# Patient Record
Sex: FEMALE | Race: WHITE | Employment: UNEMPLOYED | URBAN - METROPOLITAN AREA
[De-identification: names, ages, dates, MRNs, and addresses within clinical notes are randomized per-mention and may not be internally consistent; named-entity substitution may affect disease eponyms.]

---

## 2015-12-02 LAB — PAP-ABSTRACT: NORMAL

## 2017-01-02 ENCOUNTER — DOCUMENTATION ONLY (OUTPATIENT)
Dept: SLEEP MEDICINE | Facility: CLINIC | Age: 62
End: 2017-01-02

## 2017-01-02 NOTE — PROGRESS NOTES
SUBJECTIVE:  Chief Complaint   Patient presents with     Sleep Problem     drop off actigraphy       OBJECTIVE:  actigraphy  returned to clinic today January 2, 2017.    ASSESSMENT/PLAN:  Danii has an appointment with provider to review results

## 2017-01-03 DIAGNOSIS — N32.81 OAB (OVERACTIVE BLADDER): Primary | ICD-10-CM

## 2017-01-03 RX ORDER — MIRABEGRON 50 MG/1
50 TABLET, EXTENDED RELEASE ORAL DAILY
Qty: 30 TABLET | Refills: 0 | Status: SHIPPED | OUTPATIENT
Start: 2017-01-03 | End: 2018-04-24

## 2017-01-03 NOTE — TELEPHONE ENCOUNTER
Has upcoming appt.  1 more refill provided with Msg to keep that appt.  Lilliam Ashraf RN  [facility]

## 2017-08-21 ENCOUNTER — TRANSFERRED RECORDS (OUTPATIENT)
Dept: HEALTH INFORMATION MANAGEMENT | Facility: CLINIC | Age: 62
End: 2017-08-21

## 2017-08-29 ENCOUNTER — HOSPITAL ENCOUNTER (OUTPATIENT)
Dept: MRI IMAGING | Facility: CLINIC | Age: 62
Discharge: HOME | End: 2017-08-29
Attending: ORTHOPAEDIC SURGERY | Admitting: ORTHOPAEDIC SURGERY
Payer: MEDICARE

## 2017-08-29 DIAGNOSIS — M25.551 RIGHT HIP PAIN: ICD-10-CM

## 2017-08-29 DIAGNOSIS — M25.552 LEFT HIP PAIN: ICD-10-CM

## 2017-08-29 PROCEDURE — 72148 MRI LUMBAR SPINE W/O DYE: CPT

## 2017-09-27 ENCOUNTER — HOSPITAL ENCOUNTER (OUTPATIENT)
Dept: PHYSICAL THERAPY | Facility: CLINIC | Age: 62
Setting detail: THERAPIES SERIES
End: 2017-09-27
Attending: ORTHOPAEDIC SURGERY
Payer: MEDICARE

## 2017-09-27 PROCEDURE — G8978 MOBILITY CURRENT STATUS: HCPCS | Mod: GP,CK | Performed by: PHYSICAL THERAPIST

## 2017-09-27 PROCEDURE — G8979 MOBILITY GOAL STATUS: HCPCS | Mod: GP,CI | Performed by: PHYSICAL THERAPIST

## 2017-09-27 PROCEDURE — 97161 PT EVAL LOW COMPLEX 20 MIN: CPT | Mod: GP | Performed by: PHYSICAL THERAPIST

## 2017-09-27 PROCEDURE — 97110 THERAPEUTIC EXERCISES: CPT | Mod: GP | Performed by: PHYSICAL THERAPIST

## 2017-09-27 PROCEDURE — 40000718 ZZHC STATISTIC PT DEPARTMENT ORTHO VISIT: Performed by: PHYSICAL THERAPIST

## 2017-09-27 PROCEDURE — 97140 MANUAL THERAPY 1/> REGIONS: CPT | Mod: GP | Performed by: PHYSICAL THERAPIST

## 2017-09-27 NOTE — PROGRESS NOTES
[facility]          OUTPATIENT PHYSICAL THERAPY ORTHOPEDIC EVALUATION  PLAN OF TREATMENT FOR OUTPATIENT REHABILITATION  (COMPLETE FOR INITIAL CLAIMS ONLY)  Patient's Last Name, First Name, M.I.  YOB: 1955  Danii Levy    Provider s Name:  [facility]   Medical Record No.  [id number]   Start of Care Date:  09/27/17   Onset Date:  09/10/16   Type:     _X__PT   ___OT   ___SLP Medical Diagnosis:  Lumbar spondylitis     PT Diagnosis:  Lumbar spine pain with core musculature flexibilty and strength deficits with possible radicular involvement   Visits from SOC:  1      _________________________________________________________________________________  Plan of Treatment/Functional Goals:  joint mobilization, manual therapy, neuromuscular re-education, ROM, strengthening, stretching     Cryotherapy, Hot packs, TENS     Goals  Goal Identifier: HEP  Goal Description: Pt will be independent in HEP in order to achieve and maintain long term treatment goals.  Target Date: 10/11/17    Goal Identifier: Sitting  Goal Description: Pt will be able to sit for 20 miniutes with a minimal increase in low back pain 1-2/10.  Target Date: 11/22/17    Goal Identifier: Walking  Goal Description: Pt will be able to stand up straight first thing in the morning and walk with a minimal increase in pain 1-2/10.  Target Date: 11/22/17                                                           Therapy Frequency:  1 time/week  Predicted Duration of Therapy Intervention:  8 weeks    Jose Elias Melendez, PT                 I CERTIFY THE NEED FOR THESE SERVICES FURNISHED UNDER        THIS PLAN OF TREATMENT AND WHILE UNDER MY CARE .             Physician Signature               Date    X_____________________________________________________                               Certification Date From:  09/27/17   Certification Date To:  11/22/17    Referring Provider:  Maurizio Angeles  Assessment        See Epic Evaluation Start of Care Date: 09/27/17

## 2017-09-27 NOTE — PROGRESS NOTES
Danii Levy  1955 Physical Therapy Initial Evaluation  09/27/17 1500   General Information   Type of Visit Initial OP Ortho PT Evaluation   Start of Care Date 09/27/17   Referring Physician Maurizio Denton    Patient/Family Goals Statement Walk without pain   Orders Evaluate and Treat   Orders Comment Stretching / PROM / AAROM / Strengthening stabilization / Modities as indicated   Date of Order 08/31/17   Insurance Type Medicare;MA   Medical Diagnosis Lumbar spondylitis   Surgical/Medical history reviewed Yes   Precautions/Limitations no known precautions/limitations   Body Part(s)   Body Part(s) Lumbar Spine/SI   Presentation and Etiology   Pertinent history of current problem (include personal factors and/or comorbidities that impact the POC) Was doing squats with weights and started having pain in low back about 1 year ago. When sits for a long time will have pain. Also in the morning pain is worse. 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 Moved about 6 weks ago and pain has been worse in the morning. Gets pain down both legs to above knee. Nothing will make pain less. Starting to have some muscle spasms higher in back right now. / Comorbidities - Bipolar affective disorder      Impairments A. Pain;D. Decreased ROM;E. Decreased flexibility;H. Impaired gait   Functional Limitations perform activities of daily living;perform desired leisure / sports activities   Symptom Location Lumbar and thoracic spine, LE's B   How/Where did it occur At home;During recreation/sports   Onset date of current episode/exacerbation 09/10/16   Chronicity Recurrent   Pain quality A. Sharp;E. Shooting;F. Stabbing   Frequency of pain/symptoms A. Constant   Pain/symptoms are: Worse in the morning   Pain/symptoms exacerbated by A. Sitting;B. Walking;C. Lifting;D. Carrying;G. Certain positions;I. Bending;K. Home tasks   Pain/symptoms eased by K. Other   Pain eased by comment The pain is never better! Just the intensity    Prior Level of Function   Functional Level Prior Comment Ind   Current Level of Function   Patient role/employment history B. Student;E. Unemployed   Living environment House/townhome   Home/community accessibility 1 flight with rail   Fall Risk Screen   Fall screen completed by PT   Per patient - Fall 2 or more times in past year? No   Per patient - Fall with injury in past year? No   Is patient a fall risk? No   Lumbar Spine/SI Objective Findings   Gait/Locomotion Reduced lumbar rotation with small steps and reduced heel strike   Flexion ROM 18 inches from floor   Extension ROM 5 degrees   Right Side Bending ROM Pain in lumbar spine   Left Side Bending ROM Pain in lumbar spine   Repeated Extension-Standing ROM Pain in lumbar spine   Repeated Flexion-Standing ROM Pain into LE's B   Pelvic Screen Negative for SI provocation tests   Hip Screen Negative hip scour   Hip Flexion (L2) Strength 4-/5 on right and 4/5 on left with pain B   Hip Abduction Strength 4-/5 on right and 4/5 on left   Hip Extension Strength Unable to perform due to pain in lumbar spine   Knee Flexion Strength 5/5 B   Knee Extension (L3) Strength 4/5 B   Ankle Dorsiflexion (L4) Strength 5/5 B   Great Toe Extension (L5) Strength 5/5 B   Ankle Plantar Flexion (S1) Strength 5/5 B   Hamstring Flexibility Moderately restricted B   Piriformis Flexibility Moderately restricted B   SLR Negative B   Repeated Extension Prone Pain with prone position   Segmental Mobility Restricted T10-L5   Sensation Testing No deficits noted   Patellar Tendon Reflexes  2+ B   Palpation Right lumbar and thoracic paraspinals T10-L5   Planned Therapy Interventions   Planned Therapy Interventions joint mobilization;manual therapy;neuromuscular re-education;ROM;strengthening;stretching   Planned Modality Interventions   Planned Modality Interventions Cryotherapy;Hot packs;TENS   Clinical Impression   Criteria for Skilled Therapeutic Interventions Met yes, treatment indicated    PT Diagnosis Lumbar spine pain with core musculature flexibilty and strength deficits with possible radicular involvement   Influenced by the following impairments Pain, Strength deficits, Flexibility deficits   Functional limitations due to impairments Difficulty sitting, walking   Clinical Presentation Stable/Uncomplicated   Clinical Presentation Rationale 1 comorbidity impacting PT / 1 body system / Stable   Clinical Decision Making (Complexity) Low complexity   Therapy Frequency 1 time/week   Predicted Duration of Therapy Intervention (days/wks) 8 weeks   Risk & Benefits of therapy have been explained Yes   Patient, Family & other staff in agreement with plan of care Yes   Education Assessment   Preferred Learning Style Listening;Reading;Demonstration;Pictures/video   Barriers to Learning No barriers   ORTHO GOALS   PT Ortho Eval Goals 1;2;3;4   Ortho Goal 1   Goal Identifier HEP   Goal Description Pt will be independent in HEP in order to achieve and maintain long term treatment goals.   Target Date 10/11/17   Ortho Goal 2   Goal Identifier Sitting   Goal Description Pt will be able to sit for 20 miniutes with a minimal increase in low back pain 1-2/10.   Target Date 11/22/17   Ortho Goal 3   Goal Identifier Walking   Goal Description Pt will be able to stand up straight first thing in the morning and walk with a minimal increase in pain 1-2/10.   Target Date 11/22/17   Total Evaluation Time   Total Evaluation Time 19   Therapy Certification   Certification date from 09/27/17   Certification date to 11/22/17   Medical Diagnosis Lumbar spondylitis     Florentin Melendez PT, DPT

## 2017-10-02 ENCOUNTER — HOSPITAL ENCOUNTER (OUTPATIENT)
Dept: PHYSICAL THERAPY | Facility: CLINIC | Age: 62
Setting detail: THERAPIES SERIES
End: 2017-10-02
Attending: ORTHOPAEDIC SURGERY
Payer: MEDICARE

## 2017-10-02 PROCEDURE — 97140 MANUAL THERAPY 1/> REGIONS: CPT | Mod: GP | Performed by: PHYSICAL THERAPIST

## 2017-10-02 PROCEDURE — 97110 THERAPEUTIC EXERCISES: CPT | Mod: GP | Performed by: PHYSICAL THERAPIST

## 2017-10-02 PROCEDURE — 40000718 ZZHC STATISTIC PT DEPARTMENT ORTHO VISIT: Performed by: PHYSICAL THERAPIST

## 2017-10-20 ENCOUNTER — HOSPITAL ENCOUNTER (OUTPATIENT)
Dept: PHYSICAL THERAPY | Facility: CLINIC | Age: 62
Setting detail: THERAPIES SERIES
End: 2017-10-20
Attending: ORTHOPAEDIC SURGERY
Payer: MEDICARE

## 2017-10-20 PROCEDURE — 97110 THERAPEUTIC EXERCISES: CPT | Mod: GP | Performed by: PHYSICAL THERAPIST

## 2017-10-20 PROCEDURE — 97140 MANUAL THERAPY 1/> REGIONS: CPT | Mod: GP | Performed by: PHYSICAL THERAPIST

## 2017-10-20 PROCEDURE — 40000718 ZZHC STATISTIC PT DEPARTMENT ORTHO VISIT: Performed by: PHYSICAL THERAPIST

## 2018-03-05 ENCOUNTER — DOCUMENTATION ONLY (OUTPATIENT)
Dept: PHYSICAL THERAPY | Facility: CLINIC | Age: 63
End: 2018-03-05

## 2018-03-05 NOTE — PROGRESS NOTES
Outpatient Physical Therapy Discharge Note     Patient: Danii Levy  : 1955    Beginning/End Dates of Reporting Period:  2017 to 10/20/2017 (Total of 3 visits)    Referring Provider: Maurizio Denton    Diagnosis: Lumbar spondylitis     Client Self Report:  (From date of last visit)  Pt states her back is  hurting terribly thelast  three days, can't get  up in the morning out of  bed due to the pain.  pt states she is frustr-  ated, has incorporated  more yoga poses like  down dog into her routi-  ne with the PT stretches  because she feels that  she is not doing enough  physical activity alin-  red to a year ago. 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 pt  states she won't accept  the fact that her back  is as a result of aging,  does not want to hear  anymore about degenerat-  pilo changes. was very  upset when she heard  something was wrong on  her back from her MRI.      Treatment Has Consisted Of:  Stretching and strengthening exercise instruction / Soft tissue mobilization / Joint mobilizations / Pt education regarding diagnosis    Goals:  Goals had not been met at time of last visit.    Plan:  Discharge from therapy.    Discharge:    Reason for Discharge: Patient has failed to schedule further appointments.  Medicare G-code: Patient did not attend a final scheduled session prior to discharge. Unable to determine discharge functional status.    Equipment Issued: None    Discharge Plan: Patient to continue home program.    Florentin Melendez, PT, DPT

## 2018-04-11 ENCOUNTER — TELEPHONE (OUTPATIENT)
Dept: OBGYN | Facility: CLINIC | Age: 63
End: 2018-04-11

## 2018-04-24 ENCOUNTER — OFFICE VISIT (OUTPATIENT)
Dept: OBGYN | Facility: CLINIC | Age: 63
End: 2018-04-24
Payer: MEDICARE

## 2018-04-24 VITALS
BODY MASS INDEX: 19.88 KG/M2 | TEMPERATURE: 96.6 F | HEIGHT: 62 IN | SYSTOLIC BLOOD PRESSURE: 96 MMHG | WEIGHT: 108 LBS | HEART RATE: 70 BPM | RESPIRATION RATE: 14 BRPM | DIASTOLIC BLOOD PRESSURE: 59 MMHG

## 2018-04-24 DIAGNOSIS — N95.2 VAGINAL ATROPHY: Primary | ICD-10-CM

## 2018-04-24 DIAGNOSIS — Z01.419 ENCOUNTER FOR GYNECOLOGICAL EXAMINATION WITHOUT ABNORMAL FINDING: ICD-10-CM

## 2018-04-24 PROBLEM — Z90.710 S/P HYSTERECTOMY WITH OOPHORECTOMY: Status: ACTIVE | Noted: 2018-04-24

## 2018-04-24 PROBLEM — F60.3 BORDERLINE PERSONALITY DISORDER (H): Status: ACTIVE | Noted: 2018-04-24

## 2018-04-24 PROBLEM — Z90.721 S/P HYSTERECTOMY WITH OOPHORECTOMY: Status: ACTIVE | Noted: 2018-04-24

## 2018-04-24 PROCEDURE — 87624 HPV HI-RISK TYP POOLED RSLT: CPT | Performed by: OBSTETRICS & GYNECOLOGY

## 2018-04-24 PROCEDURE — G0145 SCR C/V CYTO,THINLAYER,RESCR: HCPCS | Performed by: OBSTETRICS & GYNECOLOGY

## 2018-04-24 PROCEDURE — G0476 HPV COMBO ASSAY CA SCREEN: HCPCS | Performed by: OBSTETRICS & GYNECOLOGY

## 2018-04-24 PROCEDURE — 99203 OFFICE O/P NEW LOW 30 MIN: CPT | Mod: 25 | Performed by: OBSTETRICS & GYNECOLOGY

## 2018-04-24 RX ORDER — NAPROXEN 500 MG/1
TABLET ORAL
Refills: 2 | COMMUNITY
Start: 2018-01-27

## 2018-04-24 RX ORDER — METHYLPHENIDATE HYDROCHLORIDE 20 MG/1
TABLET ORAL
COMMUNITY
Start: 2018-02-24

## 2018-04-24 RX ORDER — LAMOTRIGINE 100 MG/1
100 TABLET ORAL DAILY
COMMUNITY
Start: 2018-03-27

## 2018-04-24 RX ORDER — ESTRADIOL 0.1 MG/G
2 CREAM VAGINAL
Qty: 42.5 G | Refills: 3 | Status: SHIPPED | OUTPATIENT
Start: 2018-04-26

## 2018-04-24 RX ORDER — CYCLOBENZAPRINE HCL 10 MG
10 TABLET ORAL
COMMUNITY
Start: 2018-03-30

## 2018-04-24 RX ORDER — TRETINOIN 1 MG/G
CREAM TOPICAL
COMMUNITY
Start: 2018-04-16

## 2018-04-24 RX ORDER — DIAZEPAM 10 MG
10 TABLET ORAL
COMMUNITY

## 2018-04-24 RX ORDER — CYCLOSPORINE 0.5 MG/ML
EMULSION OPHTHALMIC
Refills: 11 | COMMUNITY
Start: 2018-03-27

## 2018-04-24 RX ORDER — TRAMADOL HYDROCHLORIDE 50 MG/1
TABLET ORAL
Refills: 2 | COMMUNITY
Start: 2017-08-24

## 2018-04-24 NOTE — MR AVS SNAPSHOT
"              After Visit Summary   2018    Danii Levy    MRN: [id number]           Patient Information     Date Of Birth          1955        Visit Information        Provider Department      2018 1:00 PM Ramona Calvert MD [facility]        Today's Diagnoses     Vaginal atrophy    -  1    Encounter for gynecological examination without abnormal finding           Follow-ups after your visit        Who to contact     If you have questions or need follow up information about today's clinic visit or your schedule please contact [facility] directly at [phone].  Normal or non-critical lab and imaging results will be communicated to you by Appoxeehart, letter or phone within 4 business days after the clinic has received the results. If you do not hear from us within 7 days, please contact the clinic through NoteWagont or phone. If you have a critical or abnormal lab result, we will notify you by phone as soon as possible.  Submit refill requests through [email or web address] or call your pharmacy and they will forward the refill request to us. Please allow 3 business days for your refill to be completed.          Additional Information About Your Visit        MyChart Information     [email or web address] lets you send messages to your doctor, view your test results, renew your prescriptions, schedule appointments and more. To sign up, go to [email or web address] . Click on \"Log in\" on the left side of the screen, which will take you to the Welcome page. Then click on \"Sign up Now\" on the right side of the page.     You will be asked to enter the access code listed below, as well as some personal information. Please follow the directions to create your username and password.     Your access code is: [email or web address]  Expires: 2018  2:39 PM     Your access code will  in 90 days. If you need help or a new code, please call your [facility] or [phone].        Care EveryWhere ID     This " "is your Care EveryWhere ID. This could be used by other organizations to access your [place] medical records  [id number]        Your Vitals Were     Pulse Temperature Respirations Height Breastfeeding? BMI (Body Mass Index)    70 96.6  F (35.9  C) (Tympanic) 14 5' 1.75\" (1.568 m) No 19.91 kg/m2       Blood Pressure from Last 3 Encounters:   04/24/18 96/59   12/09/16 112/72   11/08/16 116/71    Weight from Last 3 Encounters:   04/24/18 108 lb (49 kg)   12/09/16 103 lb (46.7 kg)   05/15/13 95 lb (43.1 kg)              We Performed the Following     HC VAGINAL DILATOR     HPV High Risk Types DNA Cervical     Pap imaged thin layer screen with HPV - recommended age 30 - 65 years (select HPV order below)          Today's Medication Changes          These changes are accurate as of 4/24/18  2:39 PM.  If you have any questions, ask your nurse or doctor.               Start taking these medicines.        Dose/Directions    estradiol 0.1 MG/GM cream   Commonly known as:  ESTRACE VAGINAL   Used for:  Vaginal atrophy   Started by:  Ramona Calvert MD        Dose:  2 g   Start taking on:  4/26/2018   Place 2 g vaginally twice a week   Quantity:  42.5 g   Refills:  3         These medicines have changed or have updated prescriptions.        Dose/Directions    methylphenidate 20 MG tablet   Commonly known as:  RITALIN   This may have changed:  Another medication with the same name was removed. Continue taking this medication, and follow the directions you see here.   Changed by:  Ramona Calvert MD        Refills:  0         Stop taking these medicines if you haven't already. Please contact your care team if you have questions.     mirabegron 50 MG 24 hr tablet   Commonly known as:  MYRBETRIQ   Stopped by:  Ramona Calvert MD           oxybutynin 10 MG 24 hr tablet   Commonly known as:  DITROPAN XL   Stopped by:  Ramona Calvert MD           oxybutynin 5 MG 24 hr tablet   Commonly known as:  DITROPAN XL   Stopped " by:  Ramona Calvert MD                Where to get your medicines      These medications were sent to [facility] [place], [place] - [address]  [address] [address]     Phone:  [phone]     estradiol 0.1 MG/GM cream               Information about OPIOIDS     PRESCRIPTION OPIOIDS: WHAT YOU NEED TO KNOW   You have a prescription for an opioid (narcotic) pain medicine. Opioids can cause addiction. If you have a history of chemical dependency of any type, you are at a higher risk of becoming addicted to opioids. Only take this medicine after all other options have been tried. Take it for as short a time and as few doses as possible.     Do not:    Drive. If you drive while taking these medicines, you could be arrested for driving under the influence (DUI).    Operate heavy machinery    Do any other dangerous activities while taking these medicines.     Drink any alcohol while taking these medicines.      Take with any other medicines that contain acetaminophen. Read all labels carefully. Look for the word  acetaminophen  or  Tylenol.  Ask your pharmacist if you have questions or are unsure.    Store your pills in a secure place, locked if possible. We will not replace any lost or stolen medicine. If you don t finish your medicine, please throw away (dispose) as directed by your pharmacist. The [place] Pollution Control Agency has more information about safe disposal: https://www.pca.[facility].[place].us/living-green/managing-unwanted-medications    All opioids tend to cause constipation. Drink plenty of water and eat foods that have a lot of fiber, such as fruits, vegetables, prune juice, apple juice and high-fiber cereal. Take a laxative (Miralax, milk of magnesia, Colace, Senna) if you don t move your bowels at least every other day.          Primary Care Provider Office Phone # Fax #    Juan Carlos [facility] [phone]       [facility]  [address]        Equal Access to Services     [facility]: Hadii kym henderson pati Mendez, walarada luqethanha, qarachel dylanmonicajhonatan lyman, sanjeev felipe. So [facility] [phone].    ATENCIÓN: Si habla español, tiene a martin disposición servicios gratuitos de asistencia lingüística. Mikeame al [phone].    We comply with applicable federal civil rights laws and [place] laws. We do not discriminate on the basis of race, color, national origin, age, disability, sex, sexual orientation, or gender identity.            Thank you!     Thank you for choosing [facility]  for your care. Our goal is always to provide you with excellent care. Hearing back from our patients is one way we can continue to improve our services. Please take a few minutes to complete the written survey that you may receive in the mail after your visit with us. Thank you!             Your Updated Medication List - Protect others around you: Learn how to safely use, store and throw away your medicines at www.disposemymeds.org.          This list is accurate as of 4/24/18  2:39 PM.  Always use your most recent med list.                   Brand Name Dispense Instructions for use Diagnosis    ARIPiprazole 10 MG tablet    ABILIFY     Take 10 mg by mouth every evening        ascorbic acid 250 MG tablet    VITAMIN C     4 TABLETs BY MOUTH twice daily        CALCIUM & VIT D3 BONE HEALTH PO      Take by mouth 2 times daily        cyclobenzaprine 10 MG tablet    FLEXERIL     Take 10 mg by mouth        estradiol 0.1 MG/GM cream   Start taking on:  4/26/2018    ESTRACE VAGINAL    42.5 g    Place 2 g vaginally twice a week    Vaginal atrophy       lamoTRIgine 100 MG tablet    LaMICtal     Take 100 mg by mouth daily        methylphenidate 20 MG tablet    RITALIN          naproxen 500 MG tablet    NAPROSYN          omega-3 acid ethyl esters 1 g capsule    Lovaza     Take 2 g by mouth 2 times daily.        REFRESH 1 % ophthalmic  solution   Generic drug:  carboxymethylcellulose      Apply 1 drop to eye        * RESTASIS OP      1 drop in both eyes DAILY as needed(Restasis)        * RESTASIS 0.05 % ophthalmic emulsion   Generic drug:  cycloSPORINE           traMADol 50 MG tablet    ULTRAM          tretinoin 0.1 % cream    RETIN-A          * diazepam 10 MG tablet    VALIUM     Take 10 mg by mouth        * VALIUM 5 MG tablet   Generic drug:  diazepam      1 TABLET BY MOUTH AS NEEDED        * Notice:  This list has 4 medication(s) that are the same as other medications prescribed for you. Read the directions carefully, and ask your doctor or other care provider to review them with you.

## 2018-04-24 NOTE — PROGRESS NOTES
Danii is a 63 year old here for consultation at the request of Dr. Anderson for vaginal stenosis.  Notes continued vaginal dryness.  2-3 years ago, she tried to have a pelvic examination, but was unable to tolerate a examination due to discomfort. Was able to use vagifem without too much difficulty.     ROS: Ten point review of systems was reviewed and negative except the above.    Past Medical History:   Diagnosis Date     Arthralgia of temporomandibular joint 11/13/2013     Bipolar affective disorder (H)      Menorrhagia      No past surgical history on file.  Patient Active Problem List   Diagnosis     Bipolar affective disorder (H)       ALL/Meds: Her medication and allergy histories were reviewed and are documented in their appropriate chart areas.    Social History   Substance Use Topics     Smoking status: Former Smoker     Smokeless tobacco: Never Used     Alcohol use No       FH: Her family history was reviewed and documented in its appropriate chart area.    PE: There were no vitals taken for this visit.  There is no height or weight on file to calculate BMI.    General Appearance:  healthy, alert, active, no distress  HEENT: NCAT  Abdomen: Soft, nontender.  Normal bowel sounds.  No masses  Pelvic:       - Ext: Normal external genitalia, mild atrophy       - Urethra: no lesions, no masses, no hypermobility       - Urethral Meatus: normal appearance       - Bladder: no tenderness, no masses       - Vagina: pale mucosa, no lesions, no discharge       - Cervix: no lesions, nulliparous appearance (used pediatric speculum and well tolerated)       - Uterus: absent       - Adnexa: absent       - Rectal: deferred    A/P     ICD-10-CM    1. Vaginal atrophy N95.2 HC VAGINAL DILATOR     estradiol (ESTRACE VAGINAL) 0.1 MG/GM cream   2. Encounter for gynecological examination without abnormal finding Z01.419 Pap imaged thin layer screen with HPV - recommended age 30 - 65 years (select HPV order below)     HPV High  Risk Types DNA Cervical       1. Exam was performed without difficulty using a small speculum.  Her vagina was able to accommodate a single digit examination without difficulty.  We discussed vaginal estrogen therapy, and patient is amenable.  Estrace cream prescribed although she is aware that she can switch back to vagifem if she prefers.  We also discussed dilator therapy to allow for easier examination and to decrease her anxiety if she becomes sexually active.  Supplies given in office.  Reviewed home usage.    Ramona Calvert M.D.    30 minutes was spent face to face with the patient today discussing her history, diagnosis, and follow-up plan as noted above.  Over 50% of the visit was spent in counseling and coordination of care.

## 2018-04-24 NOTE — NURSING NOTE
"Chief Complaint   Patient presents with     Consult     discuss unable to get pelvic exams        Initial BP 96/59 (BP Location: Right arm, Patient Position: Chair, Cuff Size: Adult Regular)  Pulse 70  Temp 96.6  F (35.9  C) (Tympanic)  Resp 14  Ht 5' 1.75\" (1.568 m)  Wt 108 lb (49 kg)  Breastfeeding? No  BMI 19.91 kg/m2 Estimated body mass index is 19.91 kg/(m^2) as calculated from the following:    Height as of this encounter: 5' 1.75\" (1.568 m).    Weight as of this encounter: 108 lb (49 kg).  Medication Reconciliation: complete   Nohemy Hodges CMA      "

## 2018-04-24 NOTE — LETTER
May 1, 2018    Daniilacie Levy  [address] [address]    Dear Danii,  We are happy to inform you that your PAP smear result from 4/24/18 is normal.  We are now able to do a follow up test on PAP smears. The DNA test is for HPV (Human Papilloma Virus). Cervical cancer is closely linked with certain types of HPV. Your results showed no evidence of high risk HPV.  Therefore we recommend you return in 3 years for your next pap smear and HPV test.  You will still need to return to the clinic every year for an annual exam and other preventive tests.  Please contact the clinic at [phone] with any questions.  Sincerely,    Ramona Calvert MD/rodriguez

## 2018-04-27 LAB
COPATH REPORT: NORMAL
PAP: NORMAL

## 2018-04-30 LAB
FINAL DIAGNOSIS: NORMAL
HPV HR 12 DNA CVX QL NAA+PROBE: NEGATIVE
HPV16 DNA SPEC QL NAA+PROBE: NEGATIVE
HPV18 DNA SPEC QL NAA+PROBE: NEGATIVE
SPECIMEN DESCRIPTION: NORMAL
SPECIMEN SOURCE CVX/VAG CYTO: NORMAL

## 2022-02-10 ENCOUNTER — TRANSCRIBE ORDERS (OUTPATIENT)
Dept: OTHER | Age: 67
End: 2022-02-10

## 2022-02-10 DIAGNOSIS — S03.00XA: ICD-10-CM

## 2022-02-10 DIAGNOSIS — M79.18 MYOFASCIAL PAIN: ICD-10-CM

## 2022-02-10 DIAGNOSIS — M26.622 ARTHRALGIA OF LEFT TEMPOROMANDIBULAR JOINT: Primary | ICD-10-CM

## 2022-02-16 ENCOUNTER — THERAPY VISIT (OUTPATIENT)
Dept: PHYSICAL THERAPY | Facility: CLINIC | Age: 67
End: 2022-02-16
Attending: PHYSICIAN ASSISTANT
Payer: COMMERCIAL

## 2022-02-16 DIAGNOSIS — M79.18 MYOFASCIAL PAIN: ICD-10-CM

## 2022-02-16 DIAGNOSIS — M26.622 ARTHRALGIA OF LEFT TEMPOROMANDIBULAR JOINT: ICD-10-CM

## 2022-02-16 DIAGNOSIS — S03.00XA: ICD-10-CM

## 2022-02-16 PROCEDURE — 97035 APP MDLTY 1+ULTRASOUND EA 15: CPT | Mod: GP | Performed by: PHYSICAL THERAPIST

## 2022-02-16 PROCEDURE — 97110 THERAPEUTIC EXERCISES: CPT | Mod: GP | Performed by: PHYSICAL THERAPIST

## 2022-02-16 PROCEDURE — 97162 PT EVAL MOD COMPLEX 30 MIN: CPT | Mod: GP | Performed by: PHYSICAL THERAPIST

## 2022-02-16 PROCEDURE — 97140 MANUAL THERAPY 1/> REGIONS: CPT | Mod: GP | Performed by: PHYSICAL THERAPIST

## 2022-02-16 NOTE — PROGRESS NOTES
[facility]    OUTPATIENT Physical Therapy ORTHOPEDIC EVALUATION  PLAN OF TREATMENT FOR OUTPATIENT REHABILITATION  (COMPLETE FOR INITIAL CLAIMS ONLY)  Patient's Last Name, First Name, M.I.  YOB: 1955  Danii Levy    Provider s Name:  [facility]   Medical Record No.  [id number]   Start of Care Date:  02/16/22   Onset Date:   02/04/22   Type:     _X__PT   ___OT Medical Diagnosis:    Encounter Diagnoses   Name Primary?     Arthralgia of left temporomandibular joint      Displacement of disc of temporomandibular joint      Myofascial pain         Treatment Diagnosis:  Myofascial pain, TMJ        Goals:     02/16/22 0700   Jaw Opening   Previous Functional Level Minimal restrictions   Current Functional Level Opens jaw without pain to   Performance Level 15 mm   STG Target Performance Opens jaw without pain to   Performance Level 25 mm   Rationale for healthy eating;for oral hygiene   Due Date 03/30/22   LTG Target Performance Opens jaw without pain to   Performance Level 35 mm   Rationale for eating;for oral hygiene       Therapy Frequency:  1 x per week  Predicted Duration of Therapy Intervention:  12 weeks    Ladan Boyd, PT                 I CERTIFY THE NEED FOR THESE SERVICES FURNISHED UNDER        THIS PLAN OF TREATMENT AND WHILE UNDER MY CARE .             Physician Signature               Date    X_____________________________________________________                             Certification Date From:  02/16/22   Certification Date To:  05/11/22    Referring Provider:  Nadege Duran    Initial Assessment        See Epic Evaluation SOC Date: 02/16/22